# Patient Record
Sex: FEMALE | NOT HISPANIC OR LATINO | URBAN - METROPOLITAN AREA
[De-identification: names, ages, dates, MRNs, and addresses within clinical notes are randomized per-mention and may not be internally consistent; named-entity substitution may affect disease eponyms.]

---

## 2022-07-13 ENCOUNTER — HOSPITAL ENCOUNTER (OUTPATIENT)
Dept: TELEMEDICINE | Facility: HOSPITAL | Age: 42
Discharge: HOME | End: 2022-07-13
Payer: MEDICAID

## 2022-07-13 DIAGNOSIS — R47.89: ICD-10-CM

## 2022-07-13 PROCEDURE — 99204 OFFICE O/P NEW MOD 45 MIN: CPT | Mod: 95,,, | Performed by: STUDENT IN AN ORGANIZED HEALTH CARE EDUCATION/TRAINING PROGRAM

## 2022-07-13 PROCEDURE — 99204 PR OFFICE/OUTPT VISIT, NEW, LEVL IV, 45-59 MIN: ICD-10-PCS | Mod: 95,,, | Performed by: STUDENT IN AN ORGANIZED HEALTH CARE EDUCATION/TRAINING PROGRAM

## 2022-07-14 NOTE — SUBJECTIVE & OBJECTIVE
Woke up with symptoms?: no    Recent bleeding noted: no  Does the patient take any Blood Thinners? unknown  Medications: Unknown      Past Medical History: hypertension    Past Surgical History: no major surgeries within the last 2 weeks    Family History: hypertension    Social History: unable to obtain    Allergies: Allergies have not been reviewed No relevant allergies    Review of Systems  Objective:   Vitals: There were no vitals taken for this visit. BP: chart reviewed    CT READ: Yes  No hemmorhage. No mass effect. No early infarct signs.     Physical Exam    No aphasia   No weakness   No numbness

## 2022-07-14 NOTE — CONSULTS
[facility]  Vascular Neurology  Comprehensive Stroke Center  TeleVascular Neurology Acute Consultation Note      Consults    Consulting Provider: IVAN LAGUERRE  Current Providers  No providers found    Patient Location: [facility] Emergency Department  Spoke hospital nurse at bedside with patient assisting consultant.     Patient information was obtained from patient and relative(s).         Assessment/Plan:       Diagnoses:   Abnormal rate of speech  Pt having slow speech but not able to complete sentences and follow commands. Not aphasic.   Risk of tPA outweighs the benefit.   rec STAT MRI brain wo.         STROKE DOCUMENTATION     Acute Stroke Times:   Acute Stroke Times   Last Known Normal Date: 07/13/22  Last Known Normal Time: 1845  Symptom Onset Date: 07/13/22  Symptom Onset Time: 1845  Stroke Team Called Date: 07/13/22  Stroke Team Called Time: 1850  Stroke Team Arrival Date: 07/13/22  Stroke Team Arrival Time: 1855  CT Interpretation Time: 1900  Alteplase Recommended: No  Thrombectomy Recommended: No    NIH Scale:  Interval: baseline  1a. Level of Consciousness: 0-->Alert, keenly responsive  1b. LOC Questions: 0-->Answers both questions correctly  1c. LOC Commands: 0-->Performs both tasks correctly  2. Best Gaze: 0-->Normal  3. Visual: 0-->No visual loss  4. Facial Palsy: 0-->Normal symmetrical movements  5a. Motor Arm, Left: 0-->No drift, limb holds 90 (or 45) degrees for full 10 secs  5b. Motor Arm, Right: 0-->No drift, limb holds 90 (or 45) degrees for full 10 secs  6a. Motor Leg, Left: 0-->No drift, leg holds 30 degree position for full 5 secs  6b. Motor Leg, Right: 0-->No drift, leg holds 30 degree position for full 5 secs  7. Limb Ataxia: 0-->Absent  8. Sensory: 0-->Normal, no sensory loss  9. Best Language: 0-->No aphasia, normal  10. Dysarthria: 0-->Normal  11. Extinction and Inattention (formerly  Neglect): 0-->No abnormality  Total (NIH Stroke Scale): 0     Modified [place]    [place] Coma Scale:    ABCD2 Score:    [id number] Score:   HAS -BLED Score:   ICH Score:   Hunt & David Classification:       There were no vitals taken for this visit.  Alteplase Eligible?: No  Alteplase Recommendation: Alteplase not recommended due to Mild Non-Disabling Symptoms  Possible Interventional Revascularization Candidate? No; at this time symptoms not suggestive of large vessel occlusion    Disposition Recommendation: do not transfer    Subjective:     History of Present Illness:  Pt was talking tot he mother over the phone and she suddenly went blank. Her speech was different. She was frightened. Words would not come out. She also has right leg pain. Pt was in the hospital for bilateral leg pain.         Woke up with symptoms?: no    Recent bleeding noted: no  Does the patient take any Blood Thinners? unknown  Medications: Unknown      Past Medical History: hypertension    Past Surgical History: no major surgeries within the last 2 weeks    Family History: hypertension    Social History: unable to obtain    Allergies: Allergies have not been reviewed No relevant allergies    Review of Systems  Objective:   Vitals: There were no vitals taken for this visit. BP: chart reviewed    CT READ: Yes  No hemmorhage. No mass effect. No early infarct signs.     Physical Exam    No aphasia   No weakness   No numbness         Recommended the emergency room physician to have a brief discussion with the patient and/or family if available regarding the  risks and benefits of treatment, and to briefly document the occurrence of that discussion in his clinical encounter note.     The attending portion of this evaluation, treatment, and documentation was performed per Gasper Lee MD via audiovisual.    Billing code:  (non-intervention mild to moderate stroke, TIA, some mimics)    · This patient has a critical neurological  condition/illness, with some potential for high morbidity and mortality.  · There is a moderate probability for acute neurological change leading to clinical and possibly life-threatening deterioration requiring highest level of physician preparedness for urgent intervention.  · Care was coordinated with other physicians involved in the patient's care.  · Radiologic studies and laboratory data were reviewed and interpreted, and plan of care was re-assessed based on the results.  · Diagnosis, treatment options and prognosis may have been discussed with the patient and/or family members or caregiver.      In your opinion, this was a: Tier 1 Van Negative    Consult End Time: 9:44 PM     Gasper Lee MD  Comprehensive Stroke Center  Vascular Neurology   [facility]

## 2022-07-14 NOTE — HPI
Pt was talking tot he mother over the phone and she suddenly went blank. Her speech was different. She was frightened. Words would not come out. She also has right leg pain. Pt was in the hospital for bilateral leg pain.

## 2022-07-14 NOTE — ASSESSMENT & PLAN NOTE
Pt having slow speech but not able to complete sentences and follow commands. Not aphasic.   Risk of tPA outweighs the benefit.   rec STAT MRI brain wo.